# Patient Record
(demographics unavailable — no encounter records)

---
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
## 2024-12-12 NOTE — PHYSICAL EXAM
[No Acute Distress] : no acute distress [Well Nourished] : well nourished [Well Developed] : well developed [Well-Appearing] : well-appearing [Normal Sclera/Conjunctiva] : normal sclera/conjunctiva [EOMI] : extraocular movements intact [Normal Outer Ear/Nose] : the outer ears and nose were normal in appearance [Normal Oropharynx] : the oropharynx was normal [Supple] : supple [Thyroid Normal, No Nodules] : the thyroid was normal and there were no nodules present [No Respiratory Distress] : no respiratory distress  [Normal Rate] : normal rate  [Regular Rhythm] : with a regular rhythm [Normal S1, S2] : normal S1 and S2 [No Murmur] : no murmur heard [No Edema] : there was no peripheral edema [Soft] : abdomen soft [Non-distended] : non-distended [Grossly Normal Strength/Tone] : grossly normal strength/tone [No Rash] : no rash [Coordination Grossly Intact] : coordination grossly intact [No Focal Deficits] : no focal deficits [Normal Gait] : normal gait [Normal Affect] : the affect was normal [Normal Insight/Judgement] : insight and judgment were intact

## 2024-12-12 NOTE — PLAN
[FreeTextEntry1] : #Prediabetes #Class III obesity, BMI 45  #HLD #Fatty liver disease  - Lifestyle counseling provided including  Consume a balanced, whole food diet rich in fruits, vegetables, nuts, seeds, and unprocessed or minimally processed foods Choose lean protein like fish, chicken, tofu and beans and limit saturated fats from red meat and avoid processed meats, trans fats, and fried foods Include more fiber rich foods such as fruits, vegetables, whole grains, legumes.  - Discussed weight loss medications including GLP-1 receptor agonists including dosing, mechanism of action, benefits, potential risk.  - At this time, will trial lifestyle modifications with goals created as above  - Recommend close follow up in 2-4 weeks for continued weight loss counseling
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
## 2024-12-12 NOTE — ASSESSMENT
[FreeTextEntry1] : 60 year old female with HLD, prediabetes, Class III obesity BMI >40 here for weight loss counseling and discussion about weight loss medications. Current Weight is 230 Ibs, BMI 45.
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
## 2024-12-12 NOTE — COUNSELING
[Potential consequences of obesity discussed] : Potential consequences of obesity discussed [Benefits of weight loss discussed] : Benefits of weight loss discussed [Target Wt Loss Goal ___] : Weight Loss Goals: Target weight loss goal [unfilled] lbs [Good understanding] : Patient has a good understanding of disease, goals and obesity follow-up plan [FreeTextEntry2] : Dietary assessment as stated in HPI  Patient is currently 8/10 motivated to lose weight. Main reasons for weight loss: longevity, [facility], and being healthy for daughter  Goals created today:  Decrease soda intake from 32 oz/day to 16 oz/day. Strategies created include only purchasing 7 bottles per week, trying alternative unsweetened sparkling porras, and replacing sugar craving with healthy snacks including fruits and nuts  [FreeTextEntry4] : 20
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
## 2025-03-06 NOTE — PHYSICAL EXAM
[Normal Sclera/Conjunctiva] : normal sclera/conjunctiva [Normal Outer Ear/Nose] : the outer ears and nose were normal in appearance [Normal] : normal rate, regular rhythm, normal S1 and S2 and no murmur heard [No Edema] : there was no peripheral edema [No Extremity Clubbing/Cyanosis] : no extremity clubbing/cyanosis [Soft] : abdomen soft [Non Tender] : non-tender [Non-distended] : non-distended [No Masses] : no abdominal mass palpated [Normal Gait] : normal gait [Normal Affect] : the affect was normal [Alert and Oriented x3] : oriented to person, place, and time [Normal Insight/Judgement] : insight and judgment were intact [de-identified] : obese

## 2025-03-06 NOTE — ASSESSMENT
[FreeTextEntry1] : # Obesity and associated comorbidities Pt is an excellent candidate for GLP1 candidates. Discussed diet and exercise in order to lose weight. Increase raw fruits and vegetables, decrease processed foods, fatty foods, sugars. Recommended patient eliminate soda from diet entirely. Duration of discussion 15 min. f/u sleep apnea testing fu US abd Sent in mounjaro low dose Discussed risks/benefits. She has never had pancreatitis, no personal or fam hx of thyroid cancer. Will refer to nutritionist as well.  f/u in 3 months or PRN   Visit conducted as part of ongoing, longitudinal medical care for patient's medical and other issues.
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
## 2025-03-06 NOTE — REVIEW OF SYSTEMS
[Dyspnea on Exertion] : dyspnea on exertion [Negative] : Gastrointestinal [Shortness Of Breath] : no shortness of breath [Cough] : no cough [Anxiety] : no anxiety [Depression] : no depression

## 2025-03-06 NOTE — COUNSELING
[Potential consequences of obesity discussed] : Potential consequences of obesity discussed [Benefits of weight loss discussed] : Benefits of weight loss discussed [Good understanding] : Patient has a good understanding of disease, goals and obesity follow-up plan [FreeTextEntry4] : 18

## 2025-03-06 NOTE — PHYSICAL EXAM
[Normal Sclera/Conjunctiva] : normal sclera/conjunctiva [Normal Outer Ear/Nose] : the outer ears and nose were normal in appearance [Normal] : normal rate, regular rhythm, normal S1 and S2 and no murmur heard [No Edema] : there was no peripheral edema [No Extremity Clubbing/Cyanosis] : no extremity clubbing/cyanosis [Soft] : abdomen soft [Non Tender] : non-tender [Non-distended] : non-distended [No Masses] : no abdominal mass palpated [Normal Gait] : normal gait [Normal Affect] : the affect was normal [Alert and Oriented x3] : oriented to person, place, and time [Normal Insight/Judgement] : insight and judgment were intact [de-identified] : obese

## 2025-03-13 NOTE — REVIEW OF SYSTEMS
[Fatigue] : fatigue [Nasal Discharge] : nasal discharge [Shortness Of Breath] : shortness of breath [Cough] : cough [Muscle Pain] : muscle pain [Negative] : Cardiovascular [Fever] : no fever [Chills] : no chills [Sore Throat] : no sore throat [Wheezing] : no wheezing [FreeTextEntry4] : congestion

## 2025-03-13 NOTE — ASSESSMENT
[FreeTextEntry1] : # Viral syndrome, dyspnea on exertion Sent albuterol inhaler, flonase/azelastine Recommend conservative measures - stay well-hydrated, tylenol/motrin PRN pain or fever. Trial of OTC meds - flonase/azelastine nasal spray, nasal saline spray, cough drops, cough syrup. POCT rapid flu, COVID negative f/u resp PCR When we get PCR results will call - if no improvement would consider sending abx at that time  f/u PRN

## 2025-03-13 NOTE — HISTORY OF PRESENT ILLNESS
[FreeTextEntry8] : Pt comes in for eval of possible sinus infection. Pt has bad cough this morning. 3 nights ago started w/ sore throat, next day was sneezing, yesterday started coughing. Cough is non-productive. + SOB @ rest, feels breathing is a little labored. + body aches. Took tylenol for a few days, today took theraflu. No known sick contacts. No fever/chills. No ear ache, + congestion, runny nose.

## 2025-03-13 NOTE — PHYSICAL EXAM
[Normal Sclera/Conjunctiva] : normal sclera/conjunctiva [Normal Outer Ear/Nose] : the outer ears and nose were normal in appearance [No Lymphadenopathy] : no lymphadenopathy [Supple] : supple [Normal] : no respiratory distress, lungs were clear to auscultation bilaterally and no accessory muscle use [No Extremity Clubbing/Cyanosis] : no extremity clubbing/cyanosis [Normal Gait] : normal gait [Normal Affect] : the affect was normal [Alert and Oriented x3] : oriented to person, place, and time [Normal Insight/Judgement] : insight and judgment were intact [Normal Supraclavicular Nodes] : no supraclavicular lymphadenopathy [Normal Anterior Cervical Nodes] : no anterior cervical lymphadenopathy [de-identified] : R TM erythematous w/out effusion. L TM normal, moderately erythematous oropharynx w/ no exudates

## 2025-04-08 NOTE — ASSESSMENT
[FreeTextEntry1] : 60 yo female with class 3 obesity requires medical weight loss therapy due to weight history and the positive effects weight loss can have on comorbid conditions of severe arpit, pre-dm and hld:  severe arpit:  - agrees to start Zepbound for treatment. She was instructed to increase to 5mg dose if tolerated/call me if she is having severe side effects  - she was also instructed to follow up with PCP and a pulmonologist because treatment with cpap is the preferred modality and is required at this time  class 3 obesity: - agrees to start Zepbound 2.5 mg  - Encouraged to aim for a minimum of 150 minutes moderate aerobic activity a week and resistance training 2x a week - at this time her exercise capacity is limited due to mobility issues and injuries -  We discussed following a reduced calorie diet ~1800 calories. instructed to eat 1/2 plate fruit/vege, 1/4 complex carb/whole grain, 1/4 lean protein/bean, minimize added fat, minimize processed foods. Avoid sugary drinks and aim for ~ 2 liters water a day.  - hld: cw statin followed by pcp   pre-dm: on no meds followed by pcp   follow up 5 weeks
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
## 2025-04-09 NOTE — PHYSICAL EXAM
[Normal Sclera/Conjunctiva] : normal sclera/conjunctiva [Normal Outer Ear/Nose] : the outer ears and nose were normal in appearance [Normal] : normal rate, regular rhythm, normal S1 and S2 and no murmur heard [No Extremity Clubbing/Cyanosis] : no extremity clubbing/cyanosis [Normal Gait] : normal gait [Normal Affect] : the affect was normal [Alert and Oriented x3] : oriented to person, place, and time [Normal Insight/Judgement] : insight and judgment were intact

## 2025-04-09 NOTE — HISTORY OF PRESENT ILLNESS
[FreeTextEntry1] : f/u weight management [de-identified] : Pt comes in to discuss weight management. She saw weight management MD, has severe sleep apnea.   HLD, fatty liver, preDM: cut out soda, used to drink 3x 16oz bottles/day, down to 1. Usually drinks regular coke. She lost 5 lbs with this change alone. She hasn't been exercising. Staying away from sugary sweets, eats apples instead.  She had dizziness and unsteadiness last weekend and went to ED. CT head, CXR, etc all normal. Diagnosed w/ exhaustion.

## 2025-04-09 NOTE — ASSESSMENT
[FreeTextEntry1] : # Obesity, MAFLD, HLD, etc Pt has multiple medical conditions that should qualify her for weight loss medication Weight management MD sent in [facility], patient is waiting to hear if he will get it or not She has been working on diet changes and we discussed further changes she could make. Try to further decrease soda intake. She has very limited vegetable intake, discussed benefits of increasing vegetables in diet. Duration of discussion: 15 min  # Severe YAO Sent for CPAP titration w/ sleep medicine consult  # Urinary incontinence Pt doesn't sleep well - wakes up multiple times overnight to urinate, also likely partly due to sleep apnea Referred to urogyn  f/u in Sept 2025 for CPE or PRN   Visit conducted as part of ongoing, longitudinal medical care for patient's medical and other issues.

## 2025-04-16 NOTE — HISTORY OF PRESENT ILLNESS
[FreeTextEntry1] : Patient has a history of nephrolithiasis and received ESWL previously.  Previous composition is 90% uric acid and 10% calcium oxalate.  No family history, no recurrent UTI, no large oxalate intake per diet review.  Had previous 24 hour urine and told to increase oral fluid intake.  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   Tolerating oral potassium citrate.  Bone density done early 2022 showed osteopenia. Past admission at [facility] where labs noted modestly increased creatinine, hypercalcemia.  CT showed 2 punctate nonobstructing calculi on the right.  Stopped taking multivitamins with D due to level 105.  Notes being placed on progesterone replacement therapy.  Interval history includes dietary changes and attempts at weight loss.  Taking less cola and more sparkling water/seltzer.

## 2025-04-16 NOTE — PHYSICAL EXAM
[General Appearance - Alert] : alert [General Appearance - In No Acute Distress] : in no acute distress [General Appearance - Well Developed] : well developed [General Appearance - Well Nourished] : well nourished [Sclera] : the sclera and conjunctiva were normal [PERRL With Normal Accommodation] : pupils were equal in size, round, and reactive to light [Extraocular Movements] : extraocular movements were intact [Outer Ear] : the ears and nose were normal in appearance [Neck Appearance] : the appearance of the neck was normal [] : the neck was supple [Jugular Venous Distention Increased] : there was no jugular-venous distention [Respiration, Rhythm And Depth] : normal respiratory rhythm and effort [Auscultation Breath Sounds / Voice Sounds] : lungs were clear to auscultation bilaterally [Heart Sounds] : normal S1 and S2 [Arterial Pulses Carotid] : carotid pulses were normal with no bruits [Bowel Sounds] : normal bowel sounds [Abdomen Soft] : soft [Abdomen Tenderness] : non-tender [No CVA Tenderness] : no ~M costovertebral angle tenderness [Abnormal Walk] : normal gait [Skin Color & Pigmentation] : normal skin color and pigmentation [Sensation] : the sensory exam was normal to light touch and pinprick [Oriented To Time, Place, And Person] : oriented to person, place, and time [Affect] : the affect was normal [Impaired Insight] : insight and judgment were intact [Mood] : the mood was normal

## 2025-04-16 NOTE — ASSESSMENT
[FreeTextEntry1] : 1. CKD - current renal function consistent with CKD stage 3.  Her GFR has been consistent with this stage for about 2 years.  Previous urine specimens negative.  Monitor with weight loss attempts.  Avoid NSAIDS.   2. Hypervitaminosis d - off of all vitamin D supplementation. Repeat prior to next visit.  Current calcium levels remain stable.   3. Nephrolithiasis - Previous tone analysis previously showed 90% uric acid and 10% calcium oxalate.  Most recent urine collection showed volume 1.4 liters, pH 6.9, urine calcium 11 mg/dl, improved urine magnesium and stable uric acid and calcium oxalate saturations.  Hydroxyapatite saturation increased likely due to elevated urine pH. Recommended increased fluid to achieve closer to 2 liters of urine output per day.  The increase in urine pH may be due to the patient taking in much less cola which is high in phosphoric acid that lowers urine pH.  As such, have asked her to decrease potassium citrate to once daily.  Will review a urinalysis in mid-June to reassess.  She will also repeat a renal ultrasound if it is not done by GYN-Urology at an upcoming visit.   Follow up November.  Reviewed with patient.

## 2025-04-30 NOTE — CONSULT LETTER
[Dear  ___] : Dear  [unfilled], [Consult Letter:] : I had the pleasure of evaluating your patient, [unfilled]. [Please see my note below.] : Please see my note below. [Consult Closing:] : Thank you very much for allowing me to participate in the care of this patient.  If you have any questions, please do not hesitate to contact me. [Sincerely,] : Sincerely, [DrRoly  ___] : Dr. DOSHI

## 2025-04-30 NOTE — HISTORY OF PRESENT ILLNESS
[Obstructive Sleep Apnea] : obstructive sleep apnea [Awakes Unrefreshed] : awakes unrefreshed [Daytime Somnolence] : daytime somnolence [Snoring] : snoring [TextBox_4] : 4/30/25  61F Obese Snoring Palpitations EDS Nocturia

## 2025-04-30 NOTE — DISCUSSION/SUMMARY
[FreeTextEntry1] : IMP  Obesity Severe YAO  Plan  Weight loss APAP via medium AitTouch F20 FFM 3-month FU

## 2025-05-21 NOTE — CARDIOLOGY SUMMARY
[Normal] : normal [___] : [unfilled] [None] : normal LV function [de-identified] : 9/6/2022 sinus rhythm negative precordial T wave [de-identified] : 5/8/2017 MPI  anterior defect reversible [de-identified] : 5/12/2017 ejection fraction 50% nonobstructive coronary disease [facility]
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
## 2025-05-21 NOTE — REASON FOR VISIT
[Hyperlipidemia] : hyperlipidemia [FreeTextEntry3] : Dr Green [FreeTextEntry1] : Placido recently had a bout of hematuria. She has fatigue which  coincides with the hematuria. He BP was 174. Being  related to Romero Morales, an uncle, she is concerned about her personal risk for coronary artery disease. she would like to know if she is a candidate for the nuclear test. she would even  go for a Body scan to make sure the " arteries are OK". she notes some chest pains that feel like a flash of light lasting for  a "nanosecond".  I flew up in her head. She didn't think it was very significant but she cant  stop thinking  about it. she is  working on a  renewal of a contract at work that might  effectively "ruin her  life" and she feels under a great amount of stress. She works for the [employer]. She has dyspnea on exertion.   Update 9/6/2022 Aldo notes some atypical chest pains and palpitations and would like to update her cardiovascular risk. she has a perianal abscess  Update 1/11/2024 Aldo suffered COVID in January she is undergoing multiple surgery for an anal fistula. she currently has a seton which is a ring which keeps the fistula open prior to definitive closure with a glue being planned in the near future under the care of rectal surgery.   5/16/2025 Aldo using apps for weight reduction now on second shot of Zepbound seeing Dr. Aden for kidney stones

## 2025-05-21 NOTE — PHYSICAL EXAM
[Well Developed] : well developed [Well Nourished] : well nourished [No Acute Distress] : no acute distress [Normal Venous Pressure] : normal venous pressure [No Carotid Bruit] : no carotid bruit [Normal S1, S2] : normal S1, S2 [No Murmur] : no murmur [No Rub] : no rub [No Gallop] : no gallop [Clear Lung Fields] : clear lung fields [Good Air Entry] : good air entry [No Respiratory Distress] : no respiratory distress  [Soft] : abdomen soft [Non Tender] : non-tender [No Masses/organomegaly] : no masses/organomegaly [Normal Bowel Sounds] : normal bowel sounds [Normal Gait] : normal gait [No Edema] : no edema [No Cyanosis] : no cyanosis [No Clubbing] : no clubbing [No Varicosities] : no varicosities [No Rash] : no rash [No Skin Lesions] : no skin lesions [Moves all extremities] : moves all extremities [No Focal Deficits] : no focal deficits [Normal Speech] : normal speech [Alert and Oriented] : alert and oriented [Normal memory] : normal memory [General Appearance - Well Developed] : well developed [Normal Appearance] : normal appearance [Well Groomed] : well groomed [General Appearance - Well Nourished] : well nourished [No Deformities] : no deformities [General Appearance - In No Acute Distress] : no acute distress [Normal Conjunctiva] : the conjunctiva exhibited no abnormalities [Eyelids - No Xanthelasma] : the eyelids demonstrated no xanthelasmas [Normal Oral Mucosa] : normal oral mucosa [No Oral Pallor] : no oral pallor [No Oral Cyanosis] : no oral cyanosis [Normal Jugular Venous A Waves Present] : normal jugular venous A waves present [Normal Jugular Venous V Waves Present] : normal jugular venous V waves present [No Jugular Venous Wills A Waves] : no jugular venous wills A waves [Respiration, Rhythm And Depth] : normal respiratory rhythm and effort [Exaggerated Use Of Accessory Muscles For Inspiration] : no accessory muscle use [Auscultation Breath Sounds / Voice Sounds] : lungs were clear to auscultation bilaterally [Heart Rate And Rhythm] : heart rate and rhythm were normal [Heart Sounds] : normal S1 and S2 [Murmurs] : no murmurs present [Abdomen Soft] : soft [Abdomen Tenderness] : non-tender [Abdomen Mass (___ Cm)] : no abdominal mass palpated [Abnormal Walk] : normal gait [Gait - Sufficient For Exercise Testing] : the gait was sufficient for exercise testing [Nail Clubbing] : no clubbing of the fingernails [Cyanosis, Localized] : no localized cyanosis [Petechial Hemorrhages (___cm)] : no petechial hemorrhages [Skin Color & Pigmentation] : normal skin color and pigmentation [] : no rash [No Venous Stasis] : no venous stasis [Skin Lesions] : no skin lesions [No Skin Ulcers] : no skin ulcer [No Xanthoma] : no  xanthoma was observed [Oriented To Time, Place, And Person] : oriented to person, place, and time [Affect] : the affect was normal [Mood] : the mood was normal [No Anxiety] : not feeling anxious [FreeTextEntry1] : overweight

## 2025-05-28 NOTE — PHYSICAL EXAM
[Normal Appearance] : normal appearance [Well Groomed] : well groomed [General Appearance - In No Acute Distress] : no acute distress [Edema] : no peripheral edema [Respiration, Rhythm And Depth] : normal respiratory rhythm and effort [Exaggerated Use Of Accessory Muscles For Inspiration] : no accessory muscle use [Abdomen Soft] : soft [Abdomen Tenderness] : non-tender [Costovertebral Angle Tenderness] : no ~M costovertebral angle tenderness [Urethral Meatus] : the meatus of the urethra showed no abnormalities [Urinary Bladder Findings] : the bladder was normal on palpation [Normal Station and Gait] : the gait and station were normal for the patient's age [] : no rash [No Focal Deficits] : no focal deficits [Oriented To Time, Place, And Person] : oriented to person, place, and time [Affect] : the affect was normal [No Palpable Adenopathy] : no palpable adenopathy [Mood] : the mood was normal [de-identified] : 0 contraction of 5. No prolapse.  [FreeTextEntry2] :   Alba Jackson RN

## 2025-05-28 NOTE — HISTORY OF PRESENT ILLNESS
[FreeTextEntry1] : BRIDGETTE HERNANDEZ  61 year F presents for bothersome nocturia. Was going every 2 hrs but now since started sleep apnea machine noticed decrease in severity Denies hematuria G1  Works on Heavy machinery.

## 2025-05-28 NOTE — ASSESSMENT
[FreeTextEntry1] : Patient with nocturia. Weak pelvic floor. Discussed continuing sleep apnea machine for the next few weeks and assess if nocturia continues to improve 3 day voiding diary. Urine dip - Discussed briefly nocturnal polyuria.
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
## 2025-06-03 NOTE — ASSESSMENT
[FreeTextEntry1] : 62 yo female with class 3 obesity requires medical weight loss therapy due to weight history and the positive effects weight loss can have on comorbid conditions of severe arpit, pre-dm and hld:  class 3 obesity: - agrees to increase Zepbound to 5mg, 3 month supply written  - continue to aim for a minimum of 150 minutes moderate aerobic activity a week and resistance training 2x a week - at this time her exercise capacity is limited due to mobility issues and injuries - cw  a reduced calorie diet ~1800 calories. instructed to eat 1/2 plate fruit/vege, 1/4 complex carb/whole grain, 1/4 lean protein/bean, minimize added fat, minimize processed foods. Avoid sugary drinks and aim for ~ 2 liters water a day.  severe arpit:  - cw zepbound   - cw pulmonology (f/u appt Aug 5 )  - hld: cw statin followed by pcp   pre-dm: on no meds followed by pcp   follow up ~ 8 weeks

## 2025-07-08 NOTE — ASSESSMENT
[FreeTextEntry1] :  62 y/o with bothersome nocturia, urgency and occasional UUI Discussed diet modification, avoid bladder irritants. Hydration encouraged. Bladder training Decrease fluid before bedtime Gemtesa samples 1 box given , Rx sent. May have to change to Mirabegron Disussed Botox, neuromodulation.  follow 3 months

## 2025-07-08 NOTE — ASSESSMENT
[FreeTextEntry1] :  60 y/o with bothersome nocturia, urgency and occasional UUI Discussed diet modification, avoid bladder irritants. Hydration encouraged. Bladder training Decrease fluid before bedtime Gemtesa samples 1 box given , Rx sent. May have to change to Mirabegron Disussed Botox, neuromodulation.  follow 3 months

## 2025-07-08 NOTE — HISTORY OF PRESENT ILLNESS
[FreeTextEntry1] : BRIDGETTE HERNANDEZ  61 year F presents for voiding diary review. Stopped using sleep apnea machine immediately.  Patient works at night as  heavy machinery.  Patient unable to sleep for more that 5 hours. With disrupted sleep from bathroom use or other distractions. Seeking better urine control during sleep Drinks large volume immediatly before bedtime.